# Patient Record
(demographics unavailable — no encounter records)

---

## 2025-05-16 NOTE — PLAN
[FreeTextEntry1] : 1. Continue current medications as outlined above.  2. The patient will begin taking Augmentin 875 MG BID x10 days to treat presumed sinusitis.   3. The patient will begin taking Prednisone 20 MG. The patient will take 60 MG x3 days, 40 MG x3 days, 20 MG x4 days.  4. The patient will begin using Hycodan syrup 10 ML or Tessalon Perles 100 MG PRN for cough supression.   5. The patient has been instructed to begin NeilMed nasal washes with salt, baking soda, distilled water, and budesonide. He will also add johnsons baby shampoo to the mix until his purulent nasal secretions clear.   6. The patient will undergo a chest x-ray in the AM.    7. The Influenza and COVID vaccines are recommended in the fall.   8. Cardiovascular exercise as tolerated.

## 2025-05-16 NOTE — PHYSICAL EXAM
[Normal Sclera/Conjunctiva] : normal sclera/conjunctiva [No JVD] : no jugular venous distention [Supple] : supple [Normal Rate] : normal rate  [Regular Rhythm] : with a regular rhythm [Normal S1, S2] : normal S1 and S2 [No Edema] : there was no peripheral edema [No Extremity Clubbing/Cyanosis] : no extremity clubbing/cyanosis [Normal Posterior Cervical Nodes] : no posterior cervical lymphadenopathy [Normal Anterior Cervical Nodes] : no anterior cervical lymphadenopathy [Normal Affect] : the affect was normal [Normal Insight/Judgement] : insight and judgment were intact [de-identified] : Obese male [de-identified] : The pharynx is without exudate.  Nasal mucosa is engorged and erythematous bilaterally with watery secretions.  There is tenderness to percussion over the maxillary and frontal sinus regions. [de-identified] : Expiratory wheezes are noted and are more pronounced anteriorly.  There are decreased breath sounds at the left base.  There are no focal rhonchi

## 2025-05-16 NOTE — HISTORY OF PRESENT ILLNESS
[FreeTextEntry8] : The patient was in his usual state of health until 3 weeks ago when he developed a sore throat, sinus congestion with purulent nasal secretions, and a productive cough with green sputum. The patient reports that his symptoms lasted a few days before seeming to resolve. He states that the symptoms returned with force earlier this week. He continues to have the aformementioned symptoms along with neck and back pain. He does report a stiff neck, though he denies any vision changes. The patient notes that he did have a fever of 102 earlier this week only once,, but denies any shaking chills. He does have a headache as well, noting most of the pain is between his temples. He denies any ear pain. He does have a wheeze and have post-nasal drip as well. He reports that he has been taking multiple OTC medications including Nyquil and Dayquil, tylenol, and Flonase without relief. There has been no chest pain, shortness of breath, palpitations, or PND. There have been no other acute constitutional symptoms. He comes in for this assessment.

## 2025-05-16 NOTE — REVIEW OF SYSTEMS
[Fever] : fever [Nasal Discharge] : nasal discharge [Sore Throat] : sore throat [Wheezing] : wheezing [Cough] : cough [Back Pain] : back pain [Negative] : Heme/Lymph [FreeTextEntry2] : see hpi [FreeTextEntry4] : see hpi [FreeTextEntry6] : see hpi [FreeTextEntry9] : see hpi

## 2025-05-16 NOTE — ADDENDUM
[FreeTextEntry1] : This note was written by Falguni Jung on 05/16/2025 acting as a medical scribe for Dr. Lars Umanzor MD. All medical entries made by the scribe were at my, Dr. Lars Umanzor's, direction and personally dictated by me on 05/16/2025. I have reviewed the chart and agree that the record accurately reflects my personal performance of the history, review of systems, assessment, and plan. I have also personally directed, reviewed, and agreed with the chart.